# Patient Record
Sex: MALE | Race: BLACK OR AFRICAN AMERICAN | NOT HISPANIC OR LATINO | Employment: FULL TIME | URBAN - METROPOLITAN AREA
[De-identification: names, ages, dates, MRNs, and addresses within clinical notes are randomized per-mention and may not be internally consistent; named-entity substitution may affect disease eponyms.]

---

## 2022-09-06 ENCOUNTER — HOSPITAL ENCOUNTER (OUTPATIENT)
Facility: OTHER | Age: 47
Discharge: HOME | End: 2022-09-06
Attending: INTERNAL MEDICINE | Admitting: INTERNAL MEDICINE
Payer: COMMERCIAL

## 2022-09-06 VITALS
WEIGHT: 210.56 LBS | SYSTOLIC BLOOD PRESSURE: 135 MMHG | BODY MASS INDEX: 28.52 KG/M2 | DIASTOLIC BLOOD PRESSURE: 88 MMHG | TEMPERATURE: 98 F | HEIGHT: 72 IN | HEART RATE: 81 BPM | OXYGEN SATURATION: 100 % | RESPIRATION RATE: 16 BRPM

## 2022-09-06 DIAGNOSIS — N18.6 ESRD (END STAGE RENAL DISEASE): Primary | ICD-10-CM

## 2022-09-06 PROCEDURE — C1769 GUIDE WIRE: HCPCS | Performed by: INTERNAL MEDICINE

## 2022-09-06 PROCEDURE — 63600175 PHARM REV CODE 636 W HCPCS: Performed by: INTERNAL MEDICINE

## 2022-09-06 PROCEDURE — C1725 CATH, TRANSLUMIN NON-LASER: HCPCS | Performed by: INTERNAL MEDICINE

## 2022-09-06 PROCEDURE — 25500020 PHARM REV CODE 255: Performed by: INTERNAL MEDICINE

## 2022-09-06 PROCEDURE — 25000003 PHARM REV CODE 250: Performed by: INTERNAL MEDICINE

## 2022-09-06 PROCEDURE — C1750 CATH, HEMODIALYSIS,LONG-TERM: HCPCS | Performed by: INTERNAL MEDICINE

## 2022-09-06 PROCEDURE — 77001 FLUOROGUIDE FOR VEIN DEVICE: CPT | Performed by: INTERNAL MEDICINE

## 2022-09-06 PROCEDURE — 27201423 OPTIME MED/SURG SUP & DEVICES STERILE SUPPLY: Performed by: INTERNAL MEDICINE

## 2022-09-06 PROCEDURE — 36581 REPLACE TUNNELED CV CATH: CPT | Mod: RT | Performed by: INTERNAL MEDICINE

## 2022-09-06 DEVICE — PRECISION CHRONIC CATHETER SPORT PACK,SYMMETRICAL TIP, TAL VENATRAC STYLET,14.5 FR/CH (4.8 MM) X 19 CM
Type: IMPLANTABLE DEVICE | Site: CHEST | Status: FUNCTIONAL
Brand: PALINDROME

## 2022-09-06 RX ORDER — CALCIUM ACETATE 667 MG/1
CAPSULE ORAL
COMMUNITY
Start: 2022-05-23

## 2022-09-06 RX ORDER — NIFEDIPINE 60 MG/1
60 TABLET, EXTENDED RELEASE ORAL DAILY
COMMUNITY
Start: 2022-05-24

## 2022-09-06 RX ORDER — CLOPIDOGREL BISULFATE 75 MG/1
75 TABLET ORAL DAILY
COMMUNITY
Start: 2022-04-14

## 2022-09-06 RX ORDER — HEPARIN SODIUM 1000 [USP'U]/ML
INJECTION, SOLUTION INTRAVENOUS; SUBCUTANEOUS
Status: DISCONTINUED | OUTPATIENT
Start: 2022-09-06 | End: 2022-09-06 | Stop reason: HOSPADM

## 2022-09-06 RX ORDER — ATORVASTATIN CALCIUM 80 MG/1
80 TABLET, FILM COATED ORAL DAILY
COMMUNITY
Start: 2022-04-14

## 2022-09-06 RX ORDER — FUROSEMIDE 80 MG/1
240 TABLET ORAL 2 TIMES DAILY
COMMUNITY
Start: 2022-05-10

## 2022-09-06 RX ORDER — SODIUM ZIRCONIUM CYCLOSILICATE 10 G/10G
POWDER, FOR SUSPENSION ORAL
COMMUNITY
Start: 2022-08-11

## 2022-09-06 RX ORDER — LIDOCAINE HYDROCHLORIDE 10 MG/ML
INJECTION INFILTRATION; PERINEURAL
Status: DISCONTINUED | OUTPATIENT
Start: 2022-09-06 | End: 2022-09-06 | Stop reason: HOSPADM

## 2022-09-06 NOTE — BRIEF OP NOTE
[facility] - Cath Lab ([place])  Brief Operative Note    Surgery Date: 9/6/2022     Surgeon(s) and Role:     * Hao Ya MD - Primary    Assisting Surgeon: None    Pre-op Diagnosis:  ESRD (end stage renal disease) [N18.6]    Post-op Diagnosis:  Post-Op Diagnosis Codes:     * ESRD (end stage renal disease) [N18.6]    Procedure(s) (LRB):  REPLACEMENT, CATHETER, DIALYSIS, OVER GUIDEWIRE, USING EXISTING VENOUS ACCESS (Right)    Anesthesia: 1% lidocaine    Operative Findings: Patient was prepped and draped in a sterile fashion.  This was a successful rewire of his R IJ TDC with angioplasty of the fibrin sheath in the SVC.  Patient tolerated the procedure well and no immediate complications noted.    Estimated Blood Loss: < 10 mL         Specimens:   Specimen (24h ago, onward)      None              Discharge Note    OUTCOME:  Patient was prepped and draped in a sterile fashion.  This was a successful rewire of his R IJ TDC with angioplasty of the fibrin sheath in the SVC.  Patient tolerated the procedure well and no immediate complications noted.    DISPOSITION: Home or Self Care    FINAL DIAGNOSIS:  <principal problem not specified>    FOLLOWUP: In clinic as needed    DISCHARGE INSTRUCTIONS:    Discharge Procedure Orders   Lifting restrictions   Order Comments: No heavy lifting for 24 hours.     Call MD for:  temperature >100.4     Call MD for:  severe uncontrolled pain     Call MD for:  redness, tenderness, or signs of infection (pain, swelling, redness, odor or green/yellow discharge around incision site)     Call MD for:   Order Comments: Bleeding from the access site.     Activity as tolerated

## 2022-09-06 NOTE — H&P
[facility] - Cath Lab ([place])  History & Physical    Subjective:      Chief Complaint/Reason for Admission: Here for tunneled dialysis catheter rewire/exchange    Guero Grey is a 46 y.o. male with ESRD (MWF at [facility] under my care) who presents today for TDC exchange after his dialysis unit recently noted a need for dropping his blood flow rates, in addition to severe hyperkalemia which could be a result of recirculation.    History reviewed. No pertinent past medical history.  No past surgical history on file.  History reviewed. No pertinent family history.       PTA Medications   Medication Sig    atorvastatin (LIPITOR) 80 MG tablet Take 80 mg by mouth once daily.    calcium acetate,phosphat bind, (PHOSLO) 667 mg capsule Take by mouth.    clopidogreL (PLAVIX) 75 mg tablet Take 75 mg by mouth once daily.    furosemide (LASIX) 80 MG tablet Take 240 mg by mouth 2 (two) times daily.    LOKELMA 10 gram packet Take by mouth.    NIFEdipine (PROCARDIA-XL) 60 MG (OSM) 24 hr tablet Take 60 mg by mouth once daily.     Review of patient's allergies indicates:  No Known Allergies     Review of Systems   Constitutional: Negative.    Respiratory: Negative.     Cardiovascular: Negative.      Objective:      Vital Signs (Most Recent)  Temp: 98.5 °F (36.9 °C) (09/06/22 1026)  Pulse: 76 (09/06/22 1135)  Resp: 18 (09/06/22 1135)  BP: (!) 143/82 (09/06/22 1135)  SpO2: 99 % (09/06/22 1135)    Vital Signs Range (Last 24H):  Temp:  [98.5 °F (36.9 °C)]   Pulse:  [76-85]   Resp:  [18]   BP: (133-143)/(82-86)   SpO2:  [97 %-99 %]     Physical Exam  Constitutional:       General: He is not in acute distress.     Appearance: He is well-developed. He is not diaphoretic.   HENT:      Head: Normocephalic and atraumatic.   Neck:      Comments: R IJ TDC with clean exit site  Pulmonary:      Effort: Pulmonary effort is normal. No respiratory distress.   Musculoskeletal:      Cervical back: Neck supple.   Skin:     General: Skin is warm and  dry.   Neurological:      Mental Status: He is alert and oriented to person, place, and time.   Psychiatric:         Behavior: Behavior normal.       Assessment:      There are no hospital problems to display for this patient.      Plan:      TDC exchange today.  Risks explained, consent signed.

## 2022-09-06 NOTE — PLAN OF CARE
Guero Gery has met all discharge criteria from Phase II. Vital Signs are stable, ambulating  without difficulty. Discharge instructions given, patient verbalized understanding. Discharged from facility in stable condition.

## 2022-09-06 NOTE — PROCEDURES
[facility] Interventional Nephrology Service    Date Of Procedure: 09/06/2022    Procedure: Tunneled Dialysis Central Catheter Exchange    Indication: Poor adequacy and sluggish flow    Primary Surgeon: Hao Ya MD  Assist: none    Referring [facility]: [facility]    Blood Loss: < 10 mL  Contrast Used: 10 mL    Procedure in Detail:  Patient prepped and draped in usual sterile fashion.  1% lidocaine was used for local sedation to anesthetize soft tissues.  Blunt hemostat was used to dissect exit site and fibrin sheath around catheter and its cuff.  Catheter location was visualized under fluoroscopy and appeared to be at the level of the RA.  Once the catheter and the cuff were freed, a guidewire was inserted through distal port into the central venous circulation (CVC).  This was done under fluoroscopic guidance.  Once wire was positioned to inferior vena cava, the old catheter was pulled while maintaining hemostasis as well as position of the wire in the CVC under fluoroscopy.  Before pulling catheter, contrast was injected to evaluate for a fibrin sheath.  A sheath was noted, using a 10mm by 40mm [place] PTA balloon and endeflator, the sheath was obliterated.  Post PTA film did not reveal any extravascular extravasation of contrast.  A new 19 cm catheter was then placed over the guidewire and confirmed in correct positioning under fluoroscopy.  Both ports flushed well.  The catheter was packed with 1000 units/mL heparin.  Suture was used to secure the catheter at the exit site and to anchor to patient's skin.      A total of 3 sutures were placed    Patient tolerated the procedure well and there were no complications      Hao Ya MD  [phone]

## (undated) DEVICE — DRESSING TEGADERM CHG 3.5X4.5

## (undated) DEVICE — PRESTO INFLATION DEVICE

## (undated) DEVICE — TRAY ANGIO BAPTIST

## (undated) DEVICE — GUIDEWIRE LAUREATE 035IN 180CM

## (undated) DEVICE — BLLN MUSTANG 10 X 40 X 75

## (undated) DEVICE — DRAPE T THYROID STERILE